# Patient Record
Sex: FEMALE | URBAN - METROPOLITAN AREA
[De-identification: names, ages, dates, MRNs, and addresses within clinical notes are randomized per-mention and may not be internally consistent; named-entity substitution may affect disease eponyms.]

---

## 2019-03-22 ENCOUNTER — APPOINTMENT (RX ONLY)
Dept: URBAN - METROPOLITAN AREA CLINIC 303 | Facility: CLINIC | Age: 34
Setting detail: DERMATOLOGY
End: 2019-03-22

## 2019-03-22 DIAGNOSIS — L82.1 OTHER SEBORRHEIC KERATOSIS: ICD-10-CM

## 2019-03-22 DIAGNOSIS — L73.8 OTHER SPECIFIED FOLLICULAR DISORDERS: ICD-10-CM

## 2019-03-22 DIAGNOSIS — H01.13 ECZEMATOUS DERMATITIS OF EYELID: ICD-10-CM

## 2019-03-22 PROBLEM — H01.131 ECZEMATOUS DERMATITIS OF RIGHT UPPER EYELID: Status: ACTIVE | Noted: 2019-03-22

## 2019-03-22 PROBLEM — H01.134 ECZEMATOUS DERMATITIS OF LEFT UPPER EYELID: Status: ACTIVE | Noted: 2019-03-22

## 2019-03-22 PROCEDURE — ? PRESCRIPTION MEDICATION MANAGEMENT

## 2019-03-22 PROCEDURE — ? PRESCRIPTION

## 2019-03-22 PROCEDURE — 99201: CPT

## 2019-03-22 PROCEDURE — ? COUNSELING

## 2019-03-22 RX ORDER — DESONIDE 0.5 MG/G
OINTMENT TOPICAL
Qty: 1 | Refills: 1 | Status: ERX | COMMUNITY
Start: 2019-03-22

## 2019-03-22 RX ADMIN — DESONIDE: 0.5 OINTMENT TOPICAL at 20:17

## 2019-03-22 ASSESSMENT — LOCATION SIMPLE DESCRIPTION DERM
LOCATION SIMPLE: RIGHT FOREHEAD
LOCATION SIMPLE: RIGHT SUPERIOR EYELID
LOCATION SIMPLE: LEFT NOSE
LOCATION SIMPLE: LEFT SUPERIOR EYELID

## 2019-03-22 ASSESSMENT — LOCATION DETAILED DESCRIPTION DERM
LOCATION DETAILED: RIGHT MEDIAL FOREHEAD
LOCATION DETAILED: LEFT LATERAL SUPERIOR EYELID
LOCATION DETAILED: RIGHT MEDIAL SUPERIOR EYELID
LOCATION DETAILED: LEFT NASAL SIDEWALL

## 2019-03-22 ASSESSMENT — LOCATION ZONE DERM
LOCATION ZONE: NOSE
LOCATION ZONE: FACE
LOCATION ZONE: EYELID

## 2019-03-22 NOTE — HPI: RASH
How Severe Is Your Rash?: mild
Is This A New Presentation, Or A Follow-Up?: Rash
Additional History: Her moisturizer does burn currently. She has been using that same line for a long time. She restarted using Dermalogica about a month before this started, though she had used used it in the past, as well. Stopped using toner recently because she feels her skin has been dried out from it.

## 2020-03-20 NOTE — PROCEDURE: PRESCRIPTION MEDICATION MANAGEMENT
DIAGNOSIS:  Left foot partal Lacey's calcaneus excision, 2ry repair Achillis tendon. DATE OF SURGERY: 3/9/2020, Suture bridge    HISTORY OF PRESENT ILLNESS:  Ms. Amie Schmitt 61 y.o.  female who came in today for 2 weeks postoperative visit. The patient denies any significant pain in the left heel. She has been in a splint , and non WB. She rates pain a 3/10 VAS mild achy and intermittent. Pain is worse when hanging leg dependent and better with rest and elevation. She has been using a knee scooter. No numbness or tingling sensation. No fever or Chills. PHYSICAL EXAMINATION:  The incision healing well . No signs of any erythema or drainage, moderate swelling. She has no pain with the active or passive range of motion of the left ankle and subtalar, but decrease ROM. She has intact sensation distally, and she is neurovascularly intact. IMAGING:  Two views left calcaneus taken today in the officeshowed removal of Lacey's, no acute fracture. IMPRESSION:  2 weeks out from left foot partal Lacey's calcaneus excision, 2ry repair Achillis tendon, and doing very well. PLAN: She placed in a boot, and non WB for 6 weeks. I have told the patient to work on ROM, The patient will come back for a follow up in 6 weeks. At that time, we will take 2 views of the left calcaneus. PT if needed. Procedures    Breg Tall Genisus Walking Boot     Patient was prescribed a Breg Tall Genisus Walking Boot. The left ankle will require stabilization / immobilization from this semi-rigid / rigid orthosis to improve their function. The orthosis will assist in protecting the affected area, provide functional support and facilitate healing. Patient was instructed to progress ambulation  as non weight bearing in the device. The plan of care is to progress the patient to full weight bearing status.      The patient was educated and fit by a healthcare professional with expert knowledge and specialization
Detail Level: Simple
Plan: When settled down, may still use bland ointment (Vaniply, Aquaphor) qhs. Can consider patch testing down the road if desired
Render In Strict Bullet Format?: No
Initiate Treatment: Desonide ointment+Vaniply or Aquaphor/bland moisturizers

## 2024-08-19 NOTE — PROCEDURE: MIPS QUALITY
Quality 130: Documentation Of Current Medications In The Medical Record: Current Medications Documented
Quality 226: Preventive Care And Screening: Tobacco Use: Screening And Cessation Intervention: Patient screened for tobacco use and is an ex/non-smoker
Detail Level: Detailed
Quality 431: Preventive Care And Screening: Unhealthy Alcohol Use - Screening: Patient screened for unhealthy alcohol use using a single question and scores less than 2 times per year
Quality 110: Preventive Care And Screening: Influenza Immunization: Influenza immunization was not ordered or administered, reason not given
Ambulatory